# Patient Record
Sex: FEMALE | Race: WHITE | NOT HISPANIC OR LATINO | Employment: OTHER | URBAN - NONMETROPOLITAN AREA
[De-identification: names, ages, dates, MRNs, and addresses within clinical notes are randomized per-mention and may not be internally consistent; named-entity substitution may affect disease eponyms.]

---

## 2018-06-29 ENCOUNTER — OFFICE VISIT (OUTPATIENT)
Dept: CARDIOLOGY | Facility: PHYSICIAN GROUP | Age: 73
End: 2018-06-29
Payer: MEDICARE

## 2018-06-29 VITALS
HEART RATE: 78 BPM | BODY MASS INDEX: 44.53 KG/M2 | DIASTOLIC BLOOD PRESSURE: 50 MMHG | SYSTOLIC BLOOD PRESSURE: 120 MMHG | OXYGEN SATURATION: 91 % | WEIGHT: 242 LBS | HEIGHT: 62 IN

## 2018-06-29 DIAGNOSIS — R00.2 PALPITATIONS: ICD-10-CM

## 2018-06-29 DIAGNOSIS — I10 ESSENTIAL HYPERTENSION: ICD-10-CM

## 2018-06-29 PROCEDURE — 99214 OFFICE O/P EST MOD 30 MIN: CPT | Performed by: INTERNAL MEDICINE

## 2018-06-29 ASSESSMENT — ENCOUNTER SYMPTOMS
DIZZINESS: 0
HEARTBURN: 0
NERVOUS/ANXIOUS: 0
BRUISES/BLEEDS EASILY: 0
PND: 0
NAUSEA: 0
SHORTNESS OF BREATH: 0
WEIGHT LOSS: 0
SPUTUM PRODUCTION: 1
WHEEZING: 0
COUGH: 1
EYES NEGATIVE: 1
INSOMNIA: 0
LOSS OF CONSCIOUSNESS: 0
BACK PAIN: 0
DEPRESSION: 0
PALPITATIONS: 0
MYALGIAS: 0

## 2018-06-29 NOTE — PROGRESS NOTES
Chief Complaint   Patient presents with   • Coronary Artery Disease     PREVIOUS PATIENT        Subjective:   Sandra Keen is a 72 y.o. female who presents today in f/u in regards to her HTN & palpitations    -doing exceedingly well  -meds as directed, last seen by us 16 months ago  -palps better, meds directed    Past Medical History:   Diagnosis Date   • Obese      Past Surgical History:   Procedure Laterality Date   • BREAST IMPLANT REVISION  1971   • HYSTERECTOMY RADICAL     • PRIMARY C SECTION       Family History   Problem Relation Age of Onset   • Dementia Mother    • Heart Disease Mother      Social History     Social History   • Marital status:      Spouse name: N/A   • Number of children: N/A   • Years of education: N/A     Occupational History   • Not on file.     Social History Main Topics   • Smoking status: Former Smoker     Quit date: 10/13/2010   • Smokeless tobacco: Never Used   • Alcohol use No   • Drug use: No   • Sexual activity: Not on file     Other Topics Concern   • Not on file     Social History Narrative   • No narrative on file     Allergies   Allergen Reactions   • Hydrocodone      Outpatient Encounter Prescriptions as of 6/29/2018   Medication Sig Dispense Refill   • NS SOLN 60 mL with albuterol 2.5 mg/0.5 mL NEBU 5 mL 5 mg/hr by Nebulization route.     • ALBUTEROL INH Inhale  by mouth.     • metoprolol SR (TOPROL XL) 50 MG TABLET SR 24 HR TAKE 1 TABLET EVERY DAY 90 Tab 3   • Alendronate Sodium (FOSAMAX PO) Take  by mouth.     • Multiple Vitamin (MULTI VITAMIN DAILY PO) Take  by mouth.     • Cholecalciferol (VITAMIN D PO) Take  by mouth.     • B Complex Vitamins (VITAMIN B COMPLEX PO) Take  by mouth.     • Ginkgo Biloba (GINKOBA PO) Take  by mouth.       No facility-administered encounter medications on file as of 6/29/2018.      Review of Systems   Constitutional: Negative for malaise/fatigue and weight loss.   HENT: Positive for ear pain and hearing loss. Negative for ear  "discharge.    Eyes: Negative.    Respiratory: Positive for cough and sputum production. Negative for shortness of breath and wheezing.    Cardiovascular: Negative for chest pain, palpitations, leg swelling and PND.   Gastrointestinal: Negative for heartburn and nausea.   Musculoskeletal: Negative for back pain, joint pain and myalgias.   Neurological: Negative for dizziness and loss of consciousness.   Endo/Heme/Allergies: Does not bruise/bleed easily.   Psychiatric/Behavioral: Negative for depression. The patient is not nervous/anxious and does not have insomnia.    All other systems reviewed and are negative.       Objective:   /50   Pulse 78   Ht 1.562 m (5' 1.5\")   Wt 109.8 kg (242 lb)   SpO2 91%   BMI 44.99 kg/m²     Physical Exam   Constitutional: She is oriented to person, place, and time. She appears well-developed and well-nourished.   obese   HENT:   Head: Normocephalic and atraumatic.   Eyes: EOM are normal. Pupils are equal, round, and reactive to light.   Neck: Neck supple. No JVD present. No thyromegaly present.   Cardiovascular: Normal rate, regular rhythm and intact distal pulses.    No murmur heard.  Pulmonary/Chest: Breath sounds normal. No respiratory distress. She exhibits no tenderness.   Abdominal: Bowel sounds are normal. She exhibits no distension.   Musculoskeletal: She exhibits no edema or tenderness.   Neurological: She is alert and oriented to person, place, and time. She exhibits normal muscle tone. Coordination normal.   Skin: Skin is warm and dry. No rash noted.   Psychiatric: She has a normal mood and affect. Her behavior is normal.       Assessment:     1. Palpitations     2. Essential hypertension         Medical Decision Making:  Today's Assessment / Status / Plan:       It is going over her chart, she was worked up by my partner to rule out underlying structural heart disease when she had palpitations.    Palpitations  Normal echocardiogram and Holter in 2016  Labs " reassuring from 2016, I requested her recent labs from her doctor which she tells me are quite good    Concern for heart disease her risk of heart disease  Reviewed lipid panel, LDL was 111, we talked about goals and weight loss.  She is not interested in medication and will work on this with her PCP.  She voices understanding  Blood pressure is excellent, I see that she does not have any significant medications on board for hypertension and we discussed this.    She is getting over a cold and on antibiotics.  She will continue to follow with her primary care in regards to her cough  RTC one year or as needed

## 2019-11-19 ENCOUNTER — TELEPHONE (OUTPATIENT)
Dept: CARDIOLOGY | Facility: MEDICAL CENTER | Age: 74
End: 2019-11-19